# Patient Record
Sex: MALE | Race: BLACK OR AFRICAN AMERICAN | NOT HISPANIC OR LATINO | Employment: UNEMPLOYED | URBAN - METROPOLITAN AREA
[De-identification: names, ages, dates, MRNs, and addresses within clinical notes are randomized per-mention and may not be internally consistent; named-entity substitution may affect disease eponyms.]

---

## 2021-12-24 ENCOUNTER — HOSPITAL ENCOUNTER (EMERGENCY)
Facility: CLINIC | Age: 41
Discharge: HOME | End: 2021-12-24
Attending: EMERGENCY MEDICINE | Admitting: EMERGENCY MEDICINE
Payer: MEDICAID

## 2021-12-24 ENCOUNTER — APPOINTMENT (OUTPATIENT)
Dept: GENERAL RADIOLOGY | Facility: CLINIC | Age: 41
End: 2021-12-24
Attending: EMERGENCY MEDICINE
Payer: MEDICAID

## 2021-12-24 VITALS
OXYGEN SATURATION: 97 % | WEIGHT: 230 LBS | SYSTOLIC BLOOD PRESSURE: 125 MMHG | RESPIRATION RATE: 18 BRPM | HEART RATE: 74 BPM | TEMPERATURE: 98.2 F | DIASTOLIC BLOOD PRESSURE: 95 MMHG

## 2021-12-24 DIAGNOSIS — R07.9 CHEST PAIN, UNSPECIFIED TYPE: ICD-10-CM

## 2021-12-24 DIAGNOSIS — R93.89 ABNORMAL X-RAY: ICD-10-CM

## 2021-12-24 LAB
ALBUMIN SERPL-MCNC: 4.3 G/DL (ref 3.4–5)
ALP SERPL-CCNC: 97 U/L (ref 40–150)
ALT SERPL W P-5'-P-CCNC: 37 U/L (ref 0–70)
ANION GAP SERPL CALCULATED.3IONS-SCNC: 2 MMOL/L (ref 3–14)
AST SERPL W P-5'-P-CCNC: 30 U/L (ref 0–45)
ATRIAL RATE - MUSE: 77 BPM
BASOPHILS # BLD AUTO: 0 10E3/UL (ref 0–0.2)
BASOPHILS NFR BLD AUTO: 1 %
BILIRUB SERPL-MCNC: 0.3 MG/DL (ref 0.2–1.3)
BUN SERPL-MCNC: 15 MG/DL (ref 7–30)
CALCIUM SERPL-MCNC: 9.1 MG/DL (ref 8.5–10.1)
CHLORIDE BLD-SCNC: 108 MMOL/L (ref 94–109)
CO2 SERPL-SCNC: 27 MMOL/L (ref 20–32)
CREAT SERPL-MCNC: 0.86 MG/DL (ref 0.66–1.25)
DIASTOLIC BLOOD PRESSURE - MUSE: NORMAL MMHG
EOSINOPHIL # BLD AUTO: 0.1 10E3/UL (ref 0–0.7)
EOSINOPHIL NFR BLD AUTO: 1 %
ERYTHROCYTE [DISTWIDTH] IN BLOOD BY AUTOMATED COUNT: 14.3 % (ref 10–15)
GFR SERPL CREATININE-BSD FRML MDRD: >90 ML/MIN/1.73M2
GLUCOSE BLD-MCNC: 101 MG/DL (ref 70–99)
HCT VFR BLD AUTO: 47.7 % (ref 40–53)
HGB BLD-MCNC: 15.3 G/DL (ref 13.3–17.7)
HOLD SPECIMEN: NORMAL
IMM GRANULOCYTES # BLD: 0 10E3/UL
IMM GRANULOCYTES NFR BLD: 0 %
INTERPRETATION ECG - MUSE: NORMAL
LYMPHOCYTES # BLD AUTO: 2 10E3/UL (ref 0.8–5.3)
LYMPHOCYTES NFR BLD AUTO: 35 %
MCH RBC QN AUTO: 27.3 PG (ref 26.5–33)
MCHC RBC AUTO-ENTMCNC: 32.1 G/DL (ref 31.5–36.5)
MCV RBC AUTO: 85 FL (ref 78–100)
MONOCYTES # BLD AUTO: 0.4 10E3/UL (ref 0–1.3)
MONOCYTES NFR BLD AUTO: 7 %
NEUTROPHILS # BLD AUTO: 3.2 10E3/UL (ref 1.6–8.3)
NEUTROPHILS NFR BLD AUTO: 56 %
NRBC # BLD AUTO: 0 10E3/UL
NRBC BLD AUTO-RTO: 0 /100
P AXIS - MUSE: 47 DEGREES
PLATELET # BLD AUTO: 171 10E3/UL (ref 150–450)
POTASSIUM BLD-SCNC: 4.4 MMOL/L (ref 3.4–5.3)
PR INTERVAL - MUSE: 158 MS
PROT SERPL-MCNC: 8.1 G/DL (ref 6.8–8.8)
QRS DURATION - MUSE: 80 MS
QT - MUSE: 352 MS
QTC - MUSE: 398 MS
R AXIS - MUSE: 24 DEGREES
RBC # BLD AUTO: 5.6 10E6/UL (ref 4.4–5.9)
SODIUM SERPL-SCNC: 137 MMOL/L (ref 133–144)
SYSTOLIC BLOOD PRESSURE - MUSE: NORMAL MMHG
T AXIS - MUSE: 14 DEGREES
TROPONIN I SERPL HS-MCNC: 5 NG/L
TROPONIN I SERPL HS-MCNC: 6 NG/L
VENTRICULAR RATE- MUSE: 77 BPM
WBC # BLD AUTO: 5.8 10E3/UL (ref 4–11)

## 2021-12-24 PROCEDURE — 36415 COLL VENOUS BLD VENIPUNCTURE: CPT | Performed by: EMERGENCY MEDICINE

## 2021-12-24 PROCEDURE — 99285 EMERGENCY DEPT VISIT HI MDM: CPT | Mod: 25

## 2021-12-24 PROCEDURE — 82040 ASSAY OF SERUM ALBUMIN: CPT | Performed by: EMERGENCY MEDICINE

## 2021-12-24 PROCEDURE — 84484 ASSAY OF TROPONIN QUANT: CPT | Mod: 91 | Performed by: EMERGENCY MEDICINE

## 2021-12-24 PROCEDURE — 93005 ELECTROCARDIOGRAM TRACING: CPT

## 2021-12-24 PROCEDURE — 85025 COMPLETE CBC W/AUTO DIFF WBC: CPT | Performed by: EMERGENCY MEDICINE

## 2021-12-24 PROCEDURE — 71046 X-RAY EXAM CHEST 2 VIEWS: CPT

## 2021-12-24 RX ORDER — DOXYCYCLINE HYCLATE 100 MG
100 TABLET ORAL 2 TIMES DAILY
Qty: 14 TABLET | Refills: 0 | Status: SHIPPED | OUTPATIENT
Start: 2021-12-24 | End: 2021-12-31

## 2021-12-24 ASSESSMENT — ENCOUNTER SYMPTOMS
VOMITING: 0
SHORTNESS OF BREATH: 1
DIARRHEA: 0
NAUSEA: 0
DIFFICULTY URINATING: 0
COUGH: 0
FEVER: 0

## 2021-12-24 NOTE — DISCHARGE INSTRUCTIONS
As we discussed, please come back to the ER immediately with any worsening symptoms, specifically for chest pain changes profile, or if you have any other concerns.  Come back with any shortness of breath, or any new symptoms you may have.  Please be absolutely certain to follow with your regular doctor the next 1 week as you may need a cardiac stress test.  Come back with any other concerns or questions you have.    Your x-ray did show small areas of edema, for this reason to give you antibiotics, and he also will need an ultrasound of your heart, which can safely be done next week with your regular doctor at the follow-up appointment.

## 2021-12-24 NOTE — ED PROVIDER NOTES
History   Chief Complaint:  Chest Pain    The history is provided by the patient.      Laura Lundberg is a 41 year old male who presents with his two daughters for chest pain. At around 1345, the patient noted having chest pain that starts from his left side and radiates down to his left lower back. With any heavy breathing, he states it hurts to breath. In addition to lifting his arm over his head, as it also hurts. The patient denies having any coughs, syncope, fevers, diarrhea, difficulties with urinating, nausea, or vomiting. The patient has never had a blood clot or been on blood thinners.    Review of Systems   Constitutional: Negative for fever.   Respiratory: Positive for shortness of breath. Negative for cough.    Cardiovascular: Positive for chest pain (left).   Gastrointestinal: Negative for diarrhea, nausea and vomiting.   Genitourinary: Negative for difficulty urinating.   Musculoskeletal: Back pain: lower left side.   Neurological: Negative for syncope.   All other systems reviewed and are negative.      Allergies:  The patient denies having any allergies.    Medications:  The patient is not taking any medications.    Past Medical History:     The patient denies having any past medical history.      Past Surgical History:    The patient denies having any past surgical history.     Family History:    The patient denies having any family history.    Social History:  The patient arrived to the emergency room with his two daughters.    Physical Exam     Patient Vitals for the past 24 hrs:   BP Temp Temp src Pulse Resp SpO2 Weight   12/24/21 1600 -- -- -- -- -- 97 % --   12/24/21 1515 (!) 125/95 -- -- -- -- -- --   12/24/21 1422 (!) 131/90 98.2  F (36.8  C) Temporal 74 18 100 % 104.3 kg (230 lb)       Physical Exam  Vitals: reviewed by me  General: Pt seen on [facility], pleasant, cooperative, and alert to conversation  Eyes: Tracking well, clear conjunctiva BL  ENT: MMM, midline trachea.   Lungs: No  tachypnea, no accessory muscle use. No respiratory distress. Speaking in full sentences.  CV: Rate as above  Abd: Soft, non tender, no guarding, no rebound. Non distended  MSK: no joint effusion.  No evidence of trauma  Skin: No rash  Neuro: Clear speech and no facial droop.  Psych: Not RIS, no e/o AH/VH      Emergency Department Course     ECG  ECG obtained at 1424, ECG read at 1446  Normal sinus rhythm  Normal ECG   No prior ECG to compare to.  Rate 77 bpm. MD interval 158 ms. QRS duration 80 ms. QT/QTc 352/398 ms. P-R-T axes 47 24 14.     Imaging:  XR Chest 2 Views   Final Result   IMPRESSION: Subtle patchy bilateral pulmonary opacities. Atypical   pneumonia versus mild interstitial edema are possible. No effusions.   Normal cardiac silhouette.      HARITHA GARZA MD            SYSTEM ID:  [id number]        Report per radiology    Laboratory:  Labs Ordered and Resulted from Time of ED Arrival to Time of ED Departure   COMPREHENSIVE METABOLIC PANEL - Abnormal       Result Value    Sodium 137      Potassium 4.4      Chloride 108      Carbon Dioxide (CO2) 27      Anion Gap 2 (*)     Urea Nitrogen 15      Creatinine 0.86      Calcium 9.1      Glucose 101 (*)     Alkaline Phosphatase 97      AST 30      ALT 37      Protein Total 8.1      Albumin 4.3      Bilirubin Total 0.3      GFR Estimate >90     TROPONIN I - Normal    Troponin I High Sensitivity 5     TROPONIN I - Normal    Troponin I High Sensitivity 6     CBC WITH PLATELETS AND DIFFERENTIAL    WBC Count 5.8      RBC Count 5.60      Hemoglobin 15.3      Hematocrit 47.7      MCV 85      MCH 27.3      MCHC 32.1      RDW 14.3      Platelet Count 171      % Neutrophils 56      % Lymphocytes 35      % Monocytes 7      % Eosinophils 1      % Basophils 1      % Immature Granulocytes 0      NRBCs per 100 WBC 0      Absolute Neutrophils 3.2      Absolute Lymphocytes 2.0      Absolute Monocytes 0.4      Absolute Eosinophils 0.1      Absolute Basophils 0.0      Absolute Immature  Granulocytes 0.0      Absolute NRBCs 0.0          Emergency Department Course:  Reviewed:  I reviewed nursing notes, vitals, past medical history and MIIC    Assessments:  1443 I obtained history and examined the patient as noted above.   1730 I rechecked the patient and explained findings.     Disposition:  The patient was discharged to home.     Impression & Plan     CMS Diagnoses: None    Medical Decision Making:  This is a pleasant 41-year-old male presents emergency room with chest pain that started earlier today.  There seems to be musculoskeletal component as it is worse when he lifts his arm over his head, and his x-ray also shows possible atypical pneumonia.  I have an abundance of caution I am giving him antibiotics as he is somewhat short of breath, and thankfully his troponin is also negative x2.  He is PERC negative, seems to be doing well here in the ER, and tells me he feels much better at my reassessment at time of discharge.  He has no fever, normal vital signs, and no white count.  His pain resolved here without any intervention.  I do think is reasonable for him to take antibiotics and follow with his regular doctor in the outpatient setting, he also is under the instruction to absolutely follow with his regular doctor the next 7 days as he needs an ultrasound and a cardiac stress test.  Patient understands this, he knows to come back to the ER if he cannot get it done with his regular doctor, as the x-ray also showed possible edema.  We will plan for discharge as above, red flags when to come back to the ER were discussed, patient is okay with this plan.     Diagnosis:    ICD-10-CM    1. Chest pain, unspecified type  R07.9        Scribe Disclosure:  I, Renetta Dao, am serving as a scribe at 2:45 PM on 12/24/2021 to document services personally performed by Cash Pelaez MD based on my observations and the provider's statements to me.     I, Benedict Yadav, am serving as a scribe at  5:24 PM on 12/24/2021 to document services personally performed by Cash Pelaez MD based on my observations and the provider's statements to me.     Cash Pelaez MD  12/24/21 [id number]

## 2022-05-03 ENCOUNTER — OFFICE VISIT (OUTPATIENT)
Dept: URGENT CARE | Facility: URGENT CARE | Age: 42
End: 2022-05-03
Payer: COMMERCIAL

## 2022-05-03 VITALS
SYSTOLIC BLOOD PRESSURE: 132 MMHG | HEART RATE: 78 BPM | DIASTOLIC BLOOD PRESSURE: 74 MMHG | RESPIRATION RATE: 20 BRPM | TEMPERATURE: 98 F | OXYGEN SATURATION: 98 %

## 2022-05-03 DIAGNOSIS — R10.13 ABDOMINAL PAIN, EPIGASTRIC: ICD-10-CM

## 2022-05-03 DIAGNOSIS — K21.00 GASTROESOPHAGEAL REFLUX DISEASE WITH ESOPHAGITIS WITHOUT HEMORRHAGE: Primary | ICD-10-CM

## 2022-05-03 LAB
ALBUMIN SERPL-MCNC: 3.8 G/DL (ref 3.4–5)
ALP SERPL-CCNC: 76 U/L (ref 40–150)
ALT SERPL W P-5'-P-CCNC: 21 U/L
ANION GAP SERPL CALCULATED.3IONS-SCNC: 5 MMOL/L (ref 3–14)
AST SERPL W P-5'-P-CCNC: 25 U/L (ref 0–45)
BASOPHILS # BLD AUTO: 0 10E3/UL (ref 0–0.2)
BASOPHILS NFR BLD AUTO: 0 %
BILIRUB SERPL-MCNC: 0.8 MG/DL (ref 0.2–1.3)
BUN SERPL-MCNC: 14 MG/DL (ref 7–30)
CALCIUM SERPL-MCNC: 9.3 MG/DL (ref 8.5–10.1)
CHLORIDE BLD-SCNC: 106 MMOL/L (ref 94–109)
CO2 SERPL-SCNC: 30 MMOL/L (ref 20–32)
CREAT SERPL-MCNC: 1.1 MG/DL (ref 0.66–1.25)
EOSINOPHIL # BLD AUTO: 0.2 10E3/UL (ref 0–0.7)
EOSINOPHIL NFR BLD AUTO: 4 %
ERYTHROCYTE [DISTWIDTH] IN BLOOD BY AUTOMATED COUNT: 13.9 % (ref 10–15)
GFR SERPL CREATININE-BSD FRML MDRD: 85 ML/MIN/1.73M2
GLUCOSE BLD-MCNC: 119 MG/DL (ref 70–99)
HCT VFR BLD AUTO: 47.5 % (ref 40–53)
HGB BLD-MCNC: 15.7 G/DL (ref 13.3–17.7)
LYMPHOCYTES # BLD AUTO: 2.2 10E3/UL (ref 0.8–5.3)
LYMPHOCYTES NFR BLD AUTO: 31 %
MCH RBC QN AUTO: 27.8 PG (ref 26.5–33)
MCHC RBC AUTO-ENTMCNC: 33.1 G/DL (ref 31.5–36.5)
MCV RBC AUTO: 84 FL (ref 78–100)
MONOCYTES # BLD AUTO: 0.5 10E3/UL (ref 0–1.3)
MONOCYTES NFR BLD AUTO: 7 %
NEUTROPHILS # BLD AUTO: 4 10E3/UL (ref 1.6–8.3)
NEUTROPHILS NFR BLD AUTO: 58 %
PLATELET # BLD AUTO: 124 10E3/UL (ref 150–450)
POTASSIUM BLD-SCNC: 4.7 MMOL/L (ref 3.4–5.3)
PROT SERPL-MCNC: 7 G/DL (ref 6.8–8.8)
RBC # BLD AUTO: 5.65 10E6/UL (ref 4.4–5.9)
SODIUM SERPL-SCNC: 141 MMOL/L (ref 133–144)
WBC # BLD AUTO: 6.9 10E3/UL (ref 4–11)

## 2022-05-03 PROCEDURE — 80053 COMPREHEN METABOLIC PANEL: CPT | Performed by: PHYSICIAN ASSISTANT

## 2022-05-03 PROCEDURE — 85025 COMPLETE CBC W/AUTO DIFF WBC: CPT | Performed by: PHYSICIAN ASSISTANT

## 2022-05-03 PROCEDURE — 36415 COLL VENOUS BLD VENIPUNCTURE: CPT | Performed by: PHYSICIAN ASSISTANT

## 2022-05-03 PROCEDURE — 99204 OFFICE O/P NEW MOD 45 MIN: CPT | Performed by: PHYSICIAN ASSISTANT

## 2022-05-03 RX ORDER — METHYLPREDNISOLONE 4 MG
TABLET, DOSE PACK ORAL
COMMUNITY
Start: 2022-04-26

## 2022-05-03 RX ORDER — OMEPRAZOLE 20 MG/1
20 TABLET, DELAYED RELEASE ORAL DAILY
Qty: 30 TABLET | Refills: 0 | Status: SHIPPED | OUTPATIENT
Start: 2022-05-03 | End: 2022-06-02

## 2022-05-03 NOTE — PROGRESS NOTES
SUBJECTIVE  HPI:  Laura Lundberg is a 42 year old male who presents with the CC of abdominal/pelvic pain.    Pain is located in the epigastric area, with radiation to None.  The pain is characterized as burning, sharp and stabbing, and at worst is a level 10 on a scale of 1-10.  Pain has been present for 4 day(s) and is fluctuating.  EXACERBATING FACTORS: eating  RELIEVING FACTORS: antacids, zantac.  ASSOCIATED SX: none.    Started with [employer] fried chicken.  Patient typically does not eat skin   On or fried chicken.    History reviewed. No pertinent past medical history.  Current Outpatient Medications   Medication Sig Dispense Refill     methylPREDNISolone (MEDROL DOSEPAK) 4 MG tablet therapy pack        Social History     Tobacco Use     Smoking status: Never Smoker     Smokeless tobacco: Never Used   Substance Use Topics     Alcohol use: Never       ROS:  Review of systems negative except as stated above.    OBJECTIVE:  /74   Pulse 78   Temp 98  F (36.7  C) (Tympanic)   Resp 20   SpO2 98%   GENERAL APPEARANCE: healthy, alert and no distress  EYES: EOMI,  PERRL, conjunctiva clear  HENT: ear canals and TM's normal.  Nose and mouth without ulcers, erythema or lesions  NECK: supple, nontender, no lymphadenopathy  RESP: lungs clear to auscultation - no rales, rhonchi or wheezes  CV: regular rates and rhythm, normal S1 S2, no murmur noted  ABDOMEN:  soft, nontender, no HSM or masses and bowel sounds normal  NEURO: Normal strength and tone, sensory exam grossly normal,  normal speech and mentation  SKIN: no suspicious lesions or rashes      Results for orders placed or performed in visit on 05/03/22   Comprehensive metabolic panel (BMP + Alb, Alk Phos, ALT, AST, Total. Bili, TP)     Status: Abnormal   Result Value Ref Range    Sodium 141 133 - 144 mmol/L    Potassium 4.7 3.4 - 5.3 mmol/L    Chloride 106 94 - 109 mmol/L    Carbon Dioxide (CO2) 30 20 - 32 mmol/L    Anion Gap 5 3 - 14 mmol/L    Urea Nitrogen  14 7 - 30 mg/dL    Creatinine 1.10 0.66 - 1.25 mg/dL    Calcium 9.3 8.5 - 10.1 mg/dL    Glucose 119 (H) 70 - 99 mg/dL    Alkaline Phosphatase 76 40 - 150 U/L    AST 25 0 - 45 U/L    ALT 21 U/L    Protein Total 7.0 6.8 - 8.8 g/dL    Albumin 3.8 3.4 - 5.0 g/dL    Bilirubin Total 0.8 0.2 - 1.3 mg/dL    GFR Estimate 85 >60 mL/min/1.73m2   CBC with platelets and differential     Status: Abnormal   Result Value Ref Range    WBC Count 6.9 4.0 - 11.0 10e3/uL    RBC Count 5.65 4.40 - 5.90 10e6/uL    Hemoglobin 15.7 13.3 - 17.7 g/dL    Hematocrit 47.5 40.0 - 53.0 %    MCV 84 78 - 100 fL    MCH 27.8 26.5 - 33.0 pg    MCHC 33.1 31.5 - 36.5 g/dL    RDW 13.9 10.0 - 15.0 %    Platelet Count 124 (L) 150 - 450 10e3/uL    % Neutrophils 58 %    % Lymphocytes 31 %    % Monocytes 7 %    % Eosinophils 4 %    % Basophils 0 %    Absolute Neutrophils 4.0 1.6 - 8.3 10e3/uL    Absolute Lymphocytes 2.2 0.8 - 5.3 10e3/uL    Absolute Monocytes 0.5 0.0 - 1.3 10e3/uL    Absolute Eosinophils 0.2 0.0 - 0.7 10e3/uL    Absolute Basophils 0.0 0.0 - 0.2 10e3/uL    Narrative    Result confirmed by repeat test   CBC with platelets and differential     Status: Abnormal    Narrative    The following orders were created for panel order CBC with platelets and differential.  Procedure                               Abnormality         Status                     ---------                               -----------         ------                     CBC with platelets and d...[[id number]]  Abnormal            Final result                 Please view results for these tests on the individual orders.       ASSESSMENT:  (K21.00) Gastroesophageal reflux disease with esophagitis without hemorrhage  (primary encounter diagnosis)  Plan: omeprazole (PRILOSEC OTC) 20 MG EC tablet      (R10.13) Abdominal pain, epigastric  Plan: lidocaine (viscous) (XYLOCAINE) 2 % 15 mL, alum        & mag hydroxide-simethicone (MAALOX) 15 mL GI         Cocktail, Comprehensive metabolic panel  (BMP +         Alb, Alk Phos, ALT, AST, Total. Bili, TP), CBC         with platelets and differential      IMproved with GI COCKTAIL      Red flags and emergent follow up discussed, and understood by patient  Follow up with PCP if symptoms worsen or fail to improve

## 2022-08-10 ENCOUNTER — HOSPITAL ENCOUNTER (EMERGENCY)
Facility: CLINIC | Age: 42
Discharge: HOME | End: 2022-08-10
Attending: EMERGENCY MEDICINE | Admitting: EMERGENCY MEDICINE
Payer: COMMERCIAL

## 2022-08-10 VITALS
DIASTOLIC BLOOD PRESSURE: 89 MMHG | HEART RATE: 74 BPM | HEIGHT: 70 IN | RESPIRATION RATE: 18 BRPM | BODY MASS INDEX: 32.93 KG/M2 | OXYGEN SATURATION: 96 % | WEIGHT: 230 LBS | TEMPERATURE: 98.6 F | SYSTOLIC BLOOD PRESSURE: 137 MMHG

## 2022-08-10 DIAGNOSIS — S46.211A STRAIN OF RIGHT BICEPS MUSCLE, INITIAL ENCOUNTER: ICD-10-CM

## 2022-08-10 PROCEDURE — 99282 EMERGENCY DEPT VISIT SF MDM: CPT

## 2022-08-10 ASSESSMENT — ENCOUNTER SYMPTOMS: MYALGIAS: 1

## 2022-08-10 NOTE — ED PROVIDER NOTES
"  History   Chief Complaint:  Arm Pain     The history is provided by the patient.      Laura Lundberg is a 42 year old male who presents with right bicep pain. Patient states he twisted his right bicep last night while wrestling and felt pain in the area after. Patient states he noticed a dent in the area of his bicep. Patient denies falling or taking medications for pain.     Review of Systems   Musculoskeletal: Positive for myalgias (right bicep).   All other systems reviewed and are negative.    Allergies:  No Known Allergies    Medications:  doxycycline hyclate    Past Medical History:     Patients denies past medical history.     Social History:  Patient presents alone  Patient enters by car  Patient does Door Dash    Physical Exam     Patient Vitals for the past 24 hrs:   BP Temp Temp src Pulse Resp SpO2 Height Weight   08/10/22 1021 137/89 -- -- 74 -- 96 % -- --   08/10/22 0922 -- 98.6  F (37  C) Oral -- 18 98 % 1.778 m (5' 10\") 104.3 kg (230 lb)   08/10/22 0921 (!) 134/99 -- -- 95 -- -- -- --       Physical Exam  General: Muscular black male, supine, no respiratory distress.   MSK: Right arm: Strong radial pulse. Normal sensation, capillary refill, motor intact distally. Increased pain with bicep flexion. Tenderness medially to the upper arm with defect present.   Neuro: Awake, alert, appropriate.     Emergency Department Course   Emergency Department Course:     Reviewed:  I reviewed nursing notes, vitals, past medical history and Care Everywhere    Assessments:  0928 I obtained history and examined the patient as noted above. Amenable to discharge and will follow up with [facility].    Disposition:  The patient was discharged to home.     Impression & Plan   Medical Decision Making:  Patient is a 42 year old who was wrestling last night and noted some pain in his right arm and points to the medial mid and distal biceps area. He noted a defect there. He has no other numbness or weakness in his hands, but there is " some tenderness when he flexes the arm. On exam, there is a small defect over the medial biceps area from forearm up to upper arm. CMS is good. This is likely a biceps strain or could be a complete tear. We will treat conservatively with an ACE wrap, cold elevation and sling. I have referred him to ortho for follow up.     Diagnosis:    ICD-10-CM    1. Strain of right biceps muscle, initial encounter  S46.211A      Scribe Disclosure:  FLAKITA, Alma Sexton & Chuck Curry, am serving as a scribe at 9:28 AM on 8/10/2022 to document services personally performed by Chance Ingram MD based on my observations and the provider's statements to me.            Chance Ingram MD  08/10/22 [id number]

## 2022-08-10 NOTE — ED TRIAGE NOTES
"Pt reports right arm pain. Pt reports, \"I think I tore my muscle.\" Pt reports happened last night while \"wrestling.\"     Triage Assessment     Row Name 08/10/22 0919       Respiratory WDL    Rhythm/Pattern, Respiratory pattern regular;unlabored;depth regular       Cognitive/Neuro/Behavioral WDL    Cognitive/Neuro/Behavioral WDL all       [place] Coma Scale    Best Eye Response 4-->(E4) spontaneous    Best Motor Response 6-->(M6) obeys commands    Best Verbal Response 5-->(V5) oriented    [place] Coma Scale Score 15              "

## 2023-04-07 ENCOUNTER — HOSPITAL ENCOUNTER (EMERGENCY)
Facility: CLINIC | Age: 43
Discharge: HOME | End: 2023-04-07
Attending: EMERGENCY MEDICINE | Admitting: EMERGENCY MEDICINE
Payer: COMMERCIAL

## 2023-04-07 VITALS
HEART RATE: 64 BPM | HEIGHT: 70 IN | RESPIRATION RATE: 17 BRPM | WEIGHT: 230 LBS | TEMPERATURE: 98.9 F | BODY MASS INDEX: 32.93 KG/M2 | OXYGEN SATURATION: 97 % | SYSTOLIC BLOOD PRESSURE: 151 MMHG | DIASTOLIC BLOOD PRESSURE: 109 MMHG

## 2023-04-07 DIAGNOSIS — K08.89 DENTALGIA: ICD-10-CM

## 2023-04-07 DIAGNOSIS — K04.01 ACUTE PULPITIS: ICD-10-CM

## 2023-04-07 PROCEDURE — 99284 EMERGENCY DEPT VISIT MOD MDM: CPT

## 2023-04-07 RX ORDER — HYDROCODONE BITARTRATE AND ACETAMINOPHEN 5; 325 MG/1; MG/1
1-2 TABLET ORAL EVERY 6 HOURS PRN
Qty: 15 TABLET | Refills: 0 | Status: SHIPPED | OUTPATIENT
Start: 2023-04-07 | End: 2023-04-10

## 2023-04-07 ASSESSMENT — ACTIVITIES OF DAILY LIVING (ADL): ADLS_ACUITY_SCORE: 33

## 2023-04-08 NOTE — ED PROVIDER NOTES
"  History     Chief Complaint:  Dental Pain       HPI   Laura Lundberg is a 43 year old male who presents to the ED for evaluation of ongoing and worsening dental pain.  Patient reports that he has a fractured tooth with large cavity in the posterior most left mandibular molar.  He has previously seen a dentist who started him on amoxicillin and recommended he follow-up with an endodontist for root canal.  He denies any fevers.  He notes mild swelling adjacent to the jaw at the location of the tooth.  No shortness of breath, difficulty swallowing.  He reports he has follow-up appointments with oral surgeon and endodontist as soon as possible.  Patient denies any other symptoms at this time.  He has been taking Tylenol and ibuprofen but still has significant ongoing pain.      Independent Historian:   None - Patient Only    Review of External Notes:  I reviewed dentistry notes from 3/6/2023 [facility] regarding background and treatment plan.    ROS:  See HPI    Allergies:  No Known Allergies     Physical Exam     Patient Vitals for the past 24 hrs:   BP Temp Temp src Pulse Resp SpO2 Height Weight   04/07/23 2108 (!) 151/109 98.9  F (37.2  C) Temporal 64 17 97 % 1.778 m (5' 10\") 104.3 kg (230 lb)        Physical Exam  General: Well appearing, nontoxic. Resting comfortably  Head:  Scalp and head appear normal  Eyes:  Pupils equal, round, and reactive to light    Conjunctivae noninjected and sclera white  ENT:    The nose is normal.  Tongue and oropharynx are normal.  No posterior pharyngeal swelling erythema or exudates.  Tooth #18 is fractured and carious with mild associated gingival erythema.  No fluctuance swelling or bulging.  No sublingual swelling.  No tongue elevation.  Tongue is normal.  No facial cellulitis.  Anterior neck soft tissues and submandibular space nontender to palpation and otherwise normal.    Ears/pinnae are normal  Neck:  Normal range of motion  CV:  RRR, no M/R/G  Resp:  CTAB, no increased " WOB  MSK:  Normal tone  Skin:  No rash or lesions noted.  Neuro: Speech is normal and fluent    Moves all extremities spontaneously  Psych:  Awake, Alert. Normal affect      Appropriate interactions           Emergency Department Course       Laboratory:  Labs Ordered and Resulted from Time of ED Arrival to Time of ED Departure - No data to display     Procedures       Emergency Department Course & Assessments:             Interventions:  Medications - No data to display     Assessments, Independent Interpretation, Consult/Discussion of ManagementTests:     ED Course as of 04/08/23 0137   Fri Apr 07, 2023   2310 Patient seen and evaluated.       Social Determinants of Health affecting care:  None       Disposition:  The patient was discharged to home.     Impression & Plan      Medical Decision Making:  Laura Lundberg is a 43 year old male who presents for ongoing dental pain and concern for mild facial swelling.  On my evaluation the patient is well-appearing, hemodynamically stable and afebrile.  Patient is planning on following up with an endodontist for root canal and crown.  On my evaluation there is no evidence of drainable abscess, deep space infection of the head or neck including Ludwigs angina, retropharyngeal abscess or other complication.  No fevers.  Given mild facial swelling I will broaden and continue his antibiotics to Augmentin for another week until he is able to obtain appropriate dental/endodontist follow-up.  He should continue Tylenol, ibuprofen and Norco as needed for pain.  Prescriptions for home noted below.  No evidence of any other complication at this time.  Patient is agreeable to plan of care.  Close return precautions are provided and he was discharged in stable condition.    Diagnosis:    ICD-10-CM    1. Acute pulpitis  K04.01       2. Dentalgia  K08.89            Discharge Medications:  Discharge Medication List as of 4/7/2023 11:21 PM      START taking these medications    Details    amoxicillin-clavulanate (AUGMENTIN) 875-125 MG tablet Take 1 tablet by mouth 2 times daily for 7 days, Disp-14 tablet, R-0, E-Prescribe      HYDROcodone-acetaminophen (NORCO) 5-325 MG tablet Take 1-2 tablets by mouth every 6 hours as needed for severe pain, Disp-15 tablet, R-0, E-Prescribe                4/7/2023   Dwayne Puga MD       Historical Data:  ______________________________________________________________________  Medications:    amoxicillin-clavulanate (AUGMENTIN) 875-125 MG tablet  HYDROcodone-acetaminophen (NORCO) 5-325 MG tablet  methylPREDNISolone (MEDROL DOSEPAK) 4 MG tablet therapy pack        Past Medical History:   Past Surgical History:     No past medical history on file. No past surgical history on file.   There are no problems to display for this patient.         Family History:    family history is not on file. Social History:   reports that he has never smoked. He has never used smokeless tobacco. He reports that he does not drink alcohol and does not use drugs.     PCP: No Ref-Primary, Physician          Dwayne Puga MD  04/08/23 0139

## 2023-04-08 NOTE — ED TRIAGE NOTES
"Toothache, was seen at his dentist a week ago as he needs a root canal. Is taking amoxicillin and ibuprofen but would like \"better pain meds\".     Triage Assessment     Row Name 04/07/23 2109       Triage Assessment (Adult)    Airway WDL WDL       Respiratory WDL    Respiratory WDL WDL       Skin Circulation/Temperature WDL    Skin Circulation/Temperature WDL WDL       Cardiac WDL    Cardiac WDL WDL       Peripheral/Neurovascular WDL    Peripheral Neurovascular WDL WDL       Cognitive/Neuro/Behavioral WDL    Cognitive/Neuro/Behavioral WDL WDL              "

## 2023-06-13 ENCOUNTER — HOSPITAL ENCOUNTER (EMERGENCY)
Facility: CLINIC | Age: 43
Discharge: HOME | End: 2023-06-13
Attending: EMERGENCY MEDICINE | Admitting: EMERGENCY MEDICINE
Payer: COMMERCIAL

## 2023-06-13 VITALS
RESPIRATION RATE: 18 BRPM | WEIGHT: 230 LBS | HEART RATE: 84 BPM | OXYGEN SATURATION: 98 % | TEMPERATURE: 98.2 F | DIASTOLIC BLOOD PRESSURE: 75 MMHG | BODY MASS INDEX: 32.93 KG/M2 | SYSTOLIC BLOOD PRESSURE: 124 MMHG | HEIGHT: 70 IN

## 2023-06-13 DIAGNOSIS — K08.89 PAIN, DENTAL: ICD-10-CM

## 2023-06-13 PROCEDURE — 99284 EMERGENCY DEPT VISIT MOD MDM: CPT

## 2023-06-13 RX ORDER — HYDROCODONE BITARTRATE AND ACETAMINOPHEN 5; 325 MG/1; MG/1
1 TABLET ORAL EVERY 6 HOURS PRN
Qty: 10 TABLET | Refills: 0 | Status: SHIPPED | OUTPATIENT
Start: 2023-06-13 | End: 2023-06-13

## 2023-06-13 RX ORDER — IBUPROFEN 600 MG/1
600 TABLET, FILM COATED ORAL EVERY 6 HOURS PRN
Qty: 30 TABLET | Refills: 1 | Status: SHIPPED | OUTPATIENT
Start: 2023-06-13

## 2023-06-13 RX ORDER — HYDROCODONE BITARTRATE AND ACETAMINOPHEN 5; 325 MG/1; MG/1
1 TABLET ORAL EVERY 6 HOURS PRN
Qty: 10 TABLET | Refills: 0 | Status: SHIPPED | OUTPATIENT
Start: 2023-06-13

## 2023-06-13 RX ORDER — IBUPROFEN 600 MG/1
600 TABLET, FILM COATED ORAL EVERY 6 HOURS PRN
Qty: 30 TABLET | Refills: 1 | Status: SHIPPED | OUTPATIENT
Start: 2023-06-13 | End: 2023-06-13

## 2023-06-13 NOTE — ED PROVIDER NOTES
"  History     Chief Complaint:  Dental Pain     The history is provided by the patient.      Laura Lundberg is an otherwise healthy 43 year old male who presents with lower left-sided dental pain. Patient has had this pain for the past five days due to a cracked tooth that needs a root canal. He took Ibuprofen, Tylenol, and Augmentin at home. Patient has had similar pain in the past. He presents to the ED looking for pain medications and antibiotics.     Independent Historian:   None - Patient Only    Review of External Notes:       Medications:    The patient is not currently taking any prescribed medications    Past Medical History:    No past medical history     Physical Exam     Patient Vitals for the past 24 hrs:   BP Temp Temp src Pulse Resp SpO2 Height Weight   06/13/23 1615 124/75 98.2  F (36.8  C) Oral 84 18 98 % 1.778 m (5' 10\") 104.3 kg (230 lb)      Physical Exam  Vitals reviewed.   HENT:      Mouth/Throat:      Mouth: Mucous membranes are moist.      Comments: Poor dentition with multiple fillings Tender over the left lower molar.  No soft tissue distention or uvular deviation no gingival swelling.  Cardiovascular:      Rate and Rhythm: Normal rate.   Pulmonary:      Effort: Pulmonary effort is normal.   Neurological:      Mental Status: He is alert.         Emergency Department Course     Emergency Department Course & Assessments:     Assessments:  1620 I obtained history and examined the patient as noted above. At this point I feel that the patient is safe for discharge, and the patient agrees.     Independent Interpretation (X-rays, CTs, rhythm strip):  None    Consultations/Discussion of Management or Tests:  None        Social Determinants of Health affecting care:   None    Disposition:  The patient was discharged to home.     Impression & Plan      Medical Decision Making:  Patient presents with dental pain.  Symptoms are suspicious of dental pain without concerns for referred pain to heart or " other complications.  Patient is well-appearing did consider dental block but due to 5 days of symptoms and lack of ability to intervene offered medications for pain as a bridge to follow-up with dentistry as an outpatient.    Diagnosis:    ICD-10-CM    1. Pain, dental  K08.89            Discharge Medications  amoxicillin-clavulanate (AUGMENTIN) 875-125 MG tablet Take 1 tablet by mouth 2 times daily, Disp-20 tablet, R-0, Local Print, Refills: 0    HYDROcodone-acetaminophen (NORCO) 5-325 MG tablet Take 1 tablet by mouth every 6 hours as needed for severe pain, Disp-10 tablet, R-0, Local Print, Refills: 0     ibuprofen (ADVIL/MOTRIN) 600 MG tablet Take 1 tablet (600 mg) by mouth every 6 hours as needed for moderate pain, Disp-30 tablet, R-1, Local Print, Refills: 1    Scribe Disclosure:  I, LUIS WILKERSON, am serving as a scribe at 4:22 PM on 6/13/2023 to document services personally performed by Alton Perry MD based on my observations and the provider's statements to me.     6/13/2023   Alton Perry MD Goodman, Brian Samuel, MD  06/19/23 0877

## 2023-06-13 NOTE — DISCHARGE INSTRUCTIONS
Please follow up with the dentist for definitive care for your teeth. WE will refill pain pills today, but not again, as your teeth need to be cared for by a dentist

## 2023-06-13 NOTE — ED TRIAGE NOTES
Left lower dental pain for last 5 days.     Triage Assessment     Row Name 06/13/23 1018       Triage Assessment (Adult)    Airway WDL WDL       Respiratory WDL    Respiratory WDL WDL       Skin Circulation/Temperature WDL    Skin Circulation/Temperature WDL WDL       Cardiac WDL    Cardiac WDL WDL       Peripheral/Neurovascular WDL    Peripheral Neurovascular WDL WDL       Cognitive/Neuro/Behavioral WDL    Cognitive/Neuro/Behavioral WDL WDL

## 2024-05-15 ENCOUNTER — HOSPITAL ENCOUNTER (EMERGENCY)
Facility: CLINIC | Age: 44
Discharge: HOME | End: 2024-05-15
Attending: EMERGENCY MEDICINE | Admitting: EMERGENCY MEDICINE
Payer: COMMERCIAL

## 2024-05-15 VITALS
HEART RATE: 72 BPM | OXYGEN SATURATION: 98 % | TEMPERATURE: 97.7 F | DIASTOLIC BLOOD PRESSURE: 80 MMHG | RESPIRATION RATE: 18 BRPM | HEIGHT: 70 IN | BODY MASS INDEX: 31.5 KG/M2 | WEIGHT: 220 LBS | SYSTOLIC BLOOD PRESSURE: 124 MMHG

## 2024-05-15 DIAGNOSIS — M54.9 MUSCULOSKELETAL BACK PAIN: ICD-10-CM

## 2024-05-15 PROCEDURE — 99283 EMERGENCY DEPT VISIT LOW MDM: CPT

## 2024-05-15 RX ORDER — CYCLOBENZAPRINE HCL 10 MG
10 TABLET ORAL 3 TIMES DAILY PRN
Qty: 12 TABLET | Refills: 0 | Status: SHIPPED | OUTPATIENT
Start: 2024-05-15 | End: 2024-05-21

## 2024-05-15 NOTE — ED PROVIDER NOTES
"  Emergency Department Note      History of Present Illness     Chief Complaint  Back Pain    HPI  Laura Lundberg is a 44 year old male who presents with back pain. Patient states he has had dull upper right back pain for the last week. He goes to the gym regularly doing weight workouts and thought that might have something to do with his pain but he denies any increased reps or weights. Patient says he does not feel the pain when at the gym and notes that even though the pain has worsened throughout the week, he does not know it is related to the gym. He reports the pain is worse when sitting or lying down and makes it difficult for him to sleep at night as he is mainly a bilateral side sleeper. Patient does not work a rigorous job and mostly sits when at work.  Pain seems to go away throughout the day.  Right upper he says the pain is also worse with deep breaths. He denies recent travel, fever, cough, recent illness, or chronic medical issues. Patient has not taken any analgesics for his pain. He does not have a PCP.     Independent Historian  None    Review of External Notes  I reviewed a note from 4/19/24. Patient was seen for dental pain by Dr. Doran at [facility] and was placed on antibiotics.     Past Medical History   Medical History and Problem List  No past medical history on file.    Medications  The patient is not currently taking any prescribed medications.    Surgical History   No past surgical history on file.    Physical Exam   Patient Vitals for the past 24 hrs:   BP Temp Temp src Pulse Resp SpO2 Height Weight   05/15/24 0718 124/80 97.7  F (36.5  C) Temporal 72 18 98 % 1.778 m (5' 10\") 99.8 kg (220 lb)     Physical Exam    Physical Exam   Constitutional:  Patient is oriented to person, place, and time. They appear well-developed and well-nourished. Mild distress secondary to back pain.   HENT:   Eyes:    Conjunctivae normal and EOM are normal. Pupils are equal, round, and reactive " to light.   Neck:    Normal range of motion.   Cardiovascular: Normal rate, regular rhythm and normal heart sounds.  Exam reveals no gallop and no friction rub.  No murmur heard.  Pulmonary/Chest:  Effort normal and breath sounds normal. Patient has no wheezes. Patient has no rales.   Musculoskeletal:  Pain in the right upper perispinous between shoulder blade and spinous process. No erythema, rash, or ecchymosis. Reproducible pain with change in posture.                         Full ROM and strength in upper extremity.  Neurological:   Patient is alert and oriented to person, place, and time. Patient has normal strength. No cranial nerve deficit or sensory deficit. GCS 15  Skin:   Skin is warm and dry. No rash noted. No erythema.   Psychiatric:   Patient has a normal mood and affect. Patient's behavior is normal. Judgment and thought content normal.     Diagnostics   Lab Results   None    Imaging  None    Independent Interpretation  None    ED Course    Medications Administered  Medications - No data to display    Social Determinants of Health adding to complexity of care  None    Discussion of Management/ED Course  ED Course as of 05/15/24 0736   Wed May 15, 2024   0722 I obtained history and examined the patient as noted above.     Medical Decision Making / Diagnosis   MDM  Laura Lundberg is a 44 year old male who presents for evaluation of back pain without clear injury.  He has no history of back pain in the past.  The patient did not sustain any trauma, therefore x-rays are not necessary due to the low likelihood of fracture or subluxation.  No red flag symptoms to suggest CT and/or MRI is indicated at this point.  There is no clinical evidence of cauda equina syndrome, discitis, spinal/epidural space hematoma or epidural abscess or other worrisome etiology. The neurological exam is normal and the patient's symptoms seem consistent with musculoskeletal issues and significant muscle spasm.  I also considered  other etiologies such as atypical ACS, dissection, PE.  He is PERC rule negative.  Additionally he has no risk factors for ACS or aortic disease.  His pain is very focal to the paraspinous muscles in the right upper back.  This appears to be musculoskeletal pain.  The patient will be discharged with muscle relaxer to use as directed. Ice or heat to the back and stretching exercises. No heavy lifting, bending or twisting. Return if increasing pain, numbness, weakness, or bowel or bladder dysfunction. He was advised to schedule follow-up with his primary doctor within 3 days to re-assess symptoms.    Disposition  The patient was discharged.     ICD-10 Codes:    ICD-10-CM    1. Musculoskeletal back pain  M54.9          Discharge Medications  New Prescriptions    CYCLOBENZAPRINE (FLEXERIL) 10 MG TABLET    Take 1 tablet (10 mg) by mouth 3 times daily as needed for muscle spasms     Scribe Disclosure:  Manjula BOGGS, am serving as a scribe at 7:20 AM on 5/15/2024 to document services personally performed by Khushbu Spear MD based on my observations and the provider's statements to me.      Khushbu Spear MD  05/15/24 0750

## 2024-05-15 NOTE — ED TRIAGE NOTES
Pt arrives complaining of upper right back pain. Pt states the pain started about a week ago, he was not doing anything when it started, and the pain has gotten worse. Pt states it hurts worse when he moves and when he takes a deep breath.

## 2024-08-09 DIAGNOSIS — L60.3 MEDIAN CANALIFORM NAIL DYSTROPHY: Primary | ICD-10-CM
